# Patient Record
(demographics unavailable — no encounter records)

---

## 2017-09-29 NOTE — EMERGENCY ROOM VISIT NOTE
History


Report prepared by Kacie:  Roger Daniel


Under the Supervision of:  Dr. Justino Miller D.O.


First contact with patient:  12:54


Chief Complaint:  DIZZY


Stated Complaint:  HAS MS, VERTIGO- BLURRED VISION, NAUSEA


Nursing Triage Summary:  


see  triage notes





History of Present Illness


The patient is a 31 year old female who presents to the Emergency Room with 

complaints of worsening blurry vision starting yesterday. The patient states 

that she is unable to focus unless she squints with one eye, and then it 

becomes blurry again after a while. She additionally states that this morning 

when she woke up she felt like the room was spinning. The patient states that 

she has a history of MS, and she states that she has an appointment with a 

neurologist in two weeks. She states that she takes Avonex for her MS.





   Source of History:  patient


   Onset:  yesterday


   Position:  other (global)


   Quality:  other (blurry vision)


   Timing:  worsening


Note:


Associated symptoms: Room spinning





Review of Systems


See HPI for pertinent positives & negatives. A total of 10 systems reviewed and 

were otherwise negative.





Past Medical & Surgical


Medical Problems:


(1) Multiple sclerosis








Social History


Smoking Status:  Never Smoker


Occupation Status:  employed





Current/Historical Medications


Scheduled


Gabapentin (Neurontin), 200 MG PO DAILY


Ibuprofen (Ibuprofen), 600 MG PO AS DIRECTED


Interferon Beta-1A (Avonex), 1 DOSE INJ WK


Prednisone (Prednisone), 3 TAB PO AS DIRECTED


Prednisone (Prednisone Tab), 3 TAB PO DAILY





Allergies


Coded Allergies:  


     No Known Allergies (Unverified , 9/29/17)





Physical Exam


Vital Signs











  Date Time  Temp Pulse Resp B/P (MAP) Pulse Ox O2 Delivery O2 Flow Rate FiO2


 


9/29/17 14:59  70 16 131/73 100 Room Air  


 


9/29/17 14:59 37.3 75 18 125/84 100   


 


9/29/17 12:37  75 18 129/79 100 Room Air  





    106/70    





    125/84    


 


9/29/17 11:51 37.3 75 18 129/79 100 Room Air  











Physical Exam


VITAL SIGNS: were reviewed as above.


GENERAL:Non-toxic in appearance.


SKIN: Warm dry and pink.


HEAD: Normocephalic and atraumatic.


OROPHARYNX: Is clear and moist


NECK: Supple without lymphadenopathy or meningismus.


LUNGS: clear.


HEART: Regular rate and rhythm.


ABDOMEN: Soft and nontender.


EXTREMITIES: Warm and well perfused.


NEUROLOGICALLY: Awake alert and oriented without focal deficit. Cranial nerves 2

-12 are intact. There is no pronator drift. Cerebellar testing is within normal 

limits. There is no nystagmus. There is no facial droop. Speech is clear. 

Vision is grossly normal.


MUSCULOSKELETAL: Good muscle tone. No evidence of trauma.





Medical Decision & Procedures


Medications Administered











 Medications


  (Trade)  Dose


 Ordered  Sig/Tenzin


 Route  Start Time


 Stop Time Status Last Admin


Dose Admin


 


 Prednisone


  (PredniSONE TAB)  60 mg  NOW  STAT


 PO  9/29/17 13:33


 9/29/17 13:35 DC 9/29/17 14:03


60 MG











ED Course


1254: Previous medical records were reviewed. The patient was evaluated in room 

B10. A complete history and physical examination was performed.





1324: I discussed the patient's case with Dr. Guevara, [facility], and he 

said that last time the patient had similar symptoms she was prescribed a 

prednisone taper, and she should be checked for a UTI.





1333: Prednisone Tab 60mg PO





1340: On reevaluation, the patient is feeling well. I discussed the results and 

findings with the patient. She  verbalized agreement of the treatment plan. She 

was discharged home.





Medical Decision


Differential includes acute coronary syndrome, myocardial infarction, CVA, TIA, 

anemia, infection, pneumonia, UTI, pyelonephritis, poor nutrition, dehydration, 

electrolyte disturbance,hypoglycemia.





This is a 31-year-old female who presents to the ED with a chief complaint of 

some vertigo which she describes as room spinning.  She also reports that she 

has some difficulty focusing.  The patient reports a little nausea associated 

with the vertigo.  She reports a history of MS and has had similar symptoms in 

the past with regards to her MS.  The patient is currently being followed by a 

neurologist from the [facility] Dr. Esperanza Lawton.  She is currently on 

Avonex.  She states that she has an appointment to see a local neurologist on 

October 10 from the [facility] group.  I spoke with Dr. Guevara from the 

[facility] neurology service.  The patient has had similar subjective 

symptoms in the past and been placed on prednisone taper for this.  A urine dip 

did not show infection.  The patient was placed on prednisone taper.  She was 

given 1 dose here.  She is felt to be stable for discharge.  The patient's 

neurologic exam is completely normal.





Medication Reconcilliation


Current Medication List:  was personally reviewed by me





Blood Pressure Screening


Patient's blood pressure:  Normal blood pressure





Consults


Time Called:  1320


Consulting Physician:  Dr. Guevara, [facility]


Returned Call:  1324


I discussed the patient's case with Dr. Guevara, [facility], and he said 

that last time the patient had similar symptoms she was prescribed a prednisone 

taper, and she should be checked for a UTI





Impression





 Primary Impression:  


 Vertigo


 Additional Impression:  


 Multiple sclerosis





Scribe Attestation


The scribe's documentation has been prepared under my direction and personally 

reviewed by me in its entirety. I confirm that the note above accurately 

reflects all work, treatment, procedures, and medical decision making performed 

by me.





Departure Information


Dispostion


Home / Self-Care





Prescriptions





Prednisone (Prednisone Tab) 20 Mg Tab


3 TAB PO DAILY, #12 TAB


   FOR 4 DAYS


   Prov: Justino Miller D.O.         9/29/17 


Prednisone (Prednisone) 20 Mg Tab


3 TAB PO AS DIRECTED, #14 TAB


   3 tablets daily for 3 days, 2 tablets daily for 2 days, 1 tablet


   daily for 1 day.


   Prov: Justino Miller D.O.         9/29/17





Referrals


No Doctor, Assigned (PCP)





Forms


HOME CARE DOCUMENTATION FORM,                                                 

               IMPORTANT VISIT INFORMATION





Patient Instructions


My [facility] [employer]





Additional Instructions





Prednisone: Starting tomorrow take 3 tablets daily for 3 days, 2 tablets daily 

for 2 days and then one tablet daily for one day.





Follow-up with neurology.





Return for any concerns or worsening.





Problem Qualifiers